# Patient Record
Sex: FEMALE | Race: WHITE
[De-identification: names, ages, dates, MRNs, and addresses within clinical notes are randomized per-mention and may not be internally consistent; named-entity substitution may affect disease eponyms.]

---

## 2020-06-17 ENCOUNTER — HOSPITAL ENCOUNTER (OUTPATIENT)
Dept: HOSPITAL 96 - M.CRD | Age: 50
End: 2020-06-17
Attending: INTERNAL MEDICINE
Payer: COMMERCIAL

## 2020-06-17 DIAGNOSIS — C77.9: ICD-10-CM

## 2020-06-17 DIAGNOSIS — I08.1: Primary | ICD-10-CM

## 2020-06-17 DIAGNOSIS — Z79.899: ICD-10-CM

## 2020-06-17 DIAGNOSIS — C43.9: ICD-10-CM

## 2020-06-17 NOTE — 2DMMODE
[place] [address]
Phone:  [phone] 2 D/M-MODE ECHOCARDIOGRAM     
_______________________________________________________________________________
 
Name:         SURY CORDOBA    Room:                     REG CLI
M.R.#:    [id number]     Account #:     [id number]  
Admission:    20    Attend Phys:   Timothy August,
Discharge:                Date of Birth: [birth year]  
Date of Service: 20 1020  Report #:      [id number]
        [id number]
_______________________________________________________________________________
THIS REPORT FOR:
 
cc:  AMBER MATHEWS MD          
     Physician not on staff        
     Wade Maldonado MD [facility]        
                                                                       ~
 
--------------- APPROVED REPORT --------------
 
 
Study performed:  2020 08:04:49
 
EXAM: Comprehensive 2D, Doppler, and color-flow 
Echocardiogram 
Patient Location: Out-Patient   
 
      BSA:         1.87
HR: 81 bpm BP:          140/72 mmHg 
 
Other Information 
Study Quality: Fair
 
Indications
Chemo
 
2D Dimensions
IVSd:  11.53 (7-11mm) 
LVDd:  47.40 mm  
PWd:  9.00 (7-11mm) Ascending Ao:  32.83 (22-36mm)
LVDs:  28.50 (25-40mm) 
Aortic Root:  26.49 mm 
 
Volumes
Left Atrial Volume (Systole) 
    LA ESV Index:  21.60 mL/m2
 
Aortic Valve
AoV Peak Lucien.:  1.90 m/s 
AO Peak Gr.:  14.45 mmHg LVOT Max P.42 mmHg
AO Mean Gr.:  8.59 mmHg  LVOT Mean PG:  3.45 mmHg
    LVOT Max V:  1.36 m/s
AO V2 VTI:  35.58 cm  LVOT Mean V:  0.84 m/s
    LVOT V1 VTI:  27.95 cm
 
Mitral Valve
    E/A Ratio:  0.65
 
 
[place] [address]
Phone:  [phone]                     2 D/M-MODE ECHOCARDIOGRAM     
_______________________________________________________________________________
 
Name:         ANTONYAURASURY    Room:                     REG CLI
M.R.#:    [id number]     Account #:     [id number]  
Admission:    20    Attend Phys:   Timothy August,
Discharge:                Date of Birth: [birth year]  
Date of Service: 20 1020  Report #:      [id number]
        [id number]
_______________________________________________________________________________
    MV Decel. Time:  250.70 ms
MV E Max Lucien.:  0.64 m/s 
MV PHT:  72.70 ms  
MVA (PHT):  3.03 cm2  
 
TDI
E/Lateral E':  4.92 E/Medial E':  9.14
   Medial E' Lucien.:  0.07 m/s
   Lateral E' Lucien.:  0.13 m/s
 
Pulmonary Valve
PV Peak Lucien.:  1.30 m/s PV Peak Gr.:  6.81 mmHg
 
Tricuspid Valve
    RAP Estimate:  5.00 mmHg
TR Peak Gr.:  22.03 mmHg RVSP:  27.03 mmHg
    PA Pressure:  27.03 mmHg
 
Left Ventricle
The left ventricle is normal size. endocardium was not well 
visualized making segmental wall motion analysis difficult There is 
normal left ventricular wall thickness. Left ventricular systolic 
function is normal. The left ventricular ejection fraction is within 
the normal range. LVEF is 55-60%. Grade I - abnormal relaxation 
pattern.
 
Right Ventricle
The right ventricle is normal size. The right ventricular systolic 
function is normal.
 
Atria
The left atrium size is normal. The right atrium size is 
normal.
 
Aortic Valve
The aortic valve is normal in structure. No aortic regurgitation is 
present. There is no aortic valvular stenosis.
 
Mitral Valve
There is mitral annular calcification. Trace mitral regurgitation. No 
evidence of mitral valve stenosis.
 
Tricuspid Valve
The tricuspid valve is normal in structure. Mild tricuspid 
regurgitation.
 
 
 
[place] [address]
Phone:  [phone]                     2 D/M-MODE ECHOCARDIOGRAM     
_______________________________________________________________________________
 
Name:         SURY CORDOBA LOUIS    Room:                     REG CLI
M.RHilaria#:    [id number]     Account #:     [id number]  
Admission:    20    Attend Phys:   Timothy August,
Discharge:                Date of Birth: [birth year]  
Date of Service: 20 1020  Report #:      [id number]
        [id number]
_______________________________________________________________________________
Pulmonic Valve
Pulmonic valve is not well visualized. There is no pulmonic valvular 
regurgitation.
 
Great Vessels
The aortic root is normal in size. IVC is normal in size and 
collapses >50% with inspiration.
 
Pericardium
There is no pericardial effusion.
 
<Conclusion>
Left ventricular systolic function is normal.
The left ventricular ejection fraction is within the normal range.
 
 
 
 
 
 
 
 
 
 
 
 
 
 
 
 
 
 
 
 
 
 
 
 
 
 
 
 
 
 
  <ELECTRONICALLY SIGNED>
                                           By: Wade Maldonado MD, [facility]      
  20     1020
D: 20 1020   _____________________________________
T: 20 1020   Wade Maldonado MD, [facility]        /INF

## 2021-06-13 ENCOUNTER — HOSPITAL ENCOUNTER (EMERGENCY)
Dept: HOSPITAL 96 - M.ERS | Age: 51
Discharge: HOME | End: 2021-06-13
Payer: COMMERCIAL

## 2021-06-13 VITALS — HEIGHT: 60 IN | BODY MASS INDEX: 39.27 KG/M2 | WEIGHT: 200 LBS

## 2021-06-13 VITALS — DIASTOLIC BLOOD PRESSURE: 58 MMHG | SYSTOLIC BLOOD PRESSURE: 155 MMHG

## 2021-06-13 DIAGNOSIS — B02.9: ICD-10-CM

## 2021-06-13 DIAGNOSIS — Z98.890: ICD-10-CM

## 2021-06-13 DIAGNOSIS — Z88.5: ICD-10-CM

## 2021-06-13 DIAGNOSIS — Z88.8: ICD-10-CM

## 2021-06-13 DIAGNOSIS — L03.114: Primary | ICD-10-CM

## 2021-06-13 DIAGNOSIS — Z88.6: ICD-10-CM

## 2021-07-22 ENCOUNTER — HOSPITAL ENCOUNTER (EMERGENCY)
Dept: HOSPITAL 96 - M.ERS | Age: 51
Discharge: HOME | End: 2021-07-22
Payer: COMMERCIAL

## 2021-07-22 VITALS — DIASTOLIC BLOOD PRESSURE: 65 MMHG | SYSTOLIC BLOOD PRESSURE: 145 MMHG

## 2021-07-22 VITALS — BODY MASS INDEX: 39.27 KG/M2 | HEIGHT: 60 IN | WEIGHT: 200 LBS

## 2021-07-22 DIAGNOSIS — Z88.5: ICD-10-CM

## 2021-07-22 DIAGNOSIS — Z88.8: ICD-10-CM

## 2021-07-22 DIAGNOSIS — R51.9: Primary | ICD-10-CM

## 2021-07-22 DIAGNOSIS — Z88.6: ICD-10-CM

## 2021-07-22 DIAGNOSIS — R50.9: ICD-10-CM

## 2021-07-22 DIAGNOSIS — Z98.890: ICD-10-CM

## 2021-07-22 DIAGNOSIS — Z20.822: ICD-10-CM

## 2021-07-22 LAB
ABSOLUTE BASOPHILS: 0 THOU/UL (ref 0–0.2)
ABSOLUTE EOSINOPHILS: 0 THOU/UL (ref 0–0.7)
ABSOLUTE MONOCYTES: 0.3 THOU/UL (ref 0–1.2)
ALBUMIN SERPL-MCNC: 4.2 G/DL (ref 3.4–5)
ALP SERPL-CCNC: 123 U/L (ref 46–116)
ALT SERPL-CCNC: 44 U/L (ref 30–65)
ANION GAP SERPL CALC-SCNC: 7 MMOL/L (ref 7–16)
AST SERPL-CCNC: 32 U/L (ref 15–37)
BASOPHILS NFR BLD AUTO: 0.5 %
BILIRUB SERPL-MCNC: 1 MG/DL
BUN SERPL-MCNC: 17 MG/DL (ref 7–18)
CALCIUM SERPL-MCNC: 8.7 MG/DL (ref 8.5–10.1)
CHLORIDE SERPL-SCNC: 100 MMOL/L (ref 98–107)
CO2 SERPL-SCNC: 27 MMOL/L (ref 21–32)
CREAT SERPL-MCNC: 0.9 MG/DL (ref 0.6–1.3)
EOSINOPHIL NFR BLD: 0.1 %
GLUCOSE SERPL-MCNC: 120 MG/DL (ref 70–99)
GRANULOCYTES NFR BLD MANUAL: 74.4 %
HCT VFR BLD CALC: 36.6 % (ref 37–47)
HGB BLD-MCNC: 13.1 GM/DL (ref 12–15)
LYMPHOCYTES # BLD: 0.4 THOU/UL (ref 0.8–5.3)
LYMPHOCYTES NFR BLD AUTO: 14.8 %
MCH RBC QN AUTO: 35.1 PG (ref 26–34)
MCHC RBC AUTO-ENTMCNC: 35.7 G/DL (ref 28–37)
MCV RBC: 98.2 FL (ref 80–100)
MONOCYTES NFR BLD: 10.2 %
MPV: 7.3 FL. (ref 7.2–11.1)
NEUTROPHILS # BLD: 2.1 THOU/UL (ref 1.6–8.1)
NUCLEATED RBCS: 0 /100WBC
PLATELET COUNT*: 152 THOU/UL (ref 150–400)
POTASSIUM SERPL-SCNC: 3.8 MMOL/L (ref 3.5–5.1)
PROT SERPL-MCNC: 8.1 G/DL (ref 6.4–8.2)
RBC # BLD AUTO: 3.73 MIL/UL (ref 4.2–5)
RDW-CV: 13.4 % (ref 10.5–14.5)
SODIUM SERPL-SCNC: 134 MMOL/L (ref 136–145)
WBC # BLD AUTO: 2.9 THOU/UL (ref 4–11)